# Patient Record
(demographics unavailable — no encounter records)

---

## 2025-03-27 NOTE — HISTORY OF PRESENT ILLNESS
[FreeTextEntry8] : Pt is c/o a persistent cough for 2 months. He tested positive for covid on 1/23/25